# Patient Record
Sex: FEMALE | Race: WHITE | URBAN - METROPOLITAN AREA
[De-identification: names, ages, dates, MRNs, and addresses within clinical notes are randomized per-mention and may not be internally consistent; named-entity substitution may affect disease eponyms.]

---

## 2018-07-12 ENCOUNTER — OFFICE VISIT (OUTPATIENT)
Dept: FAMILY MEDICINE | Facility: OTHER | Age: 18
End: 2018-07-12
Payer: COMMERCIAL

## 2018-07-12 VITALS
WEIGHT: 170 LBS | BODY MASS INDEX: 28.32 KG/M2 | HEART RATE: 80 BPM | RESPIRATION RATE: 20 BRPM | HEIGHT: 65 IN | OXYGEN SATURATION: 98 % | DIASTOLIC BLOOD PRESSURE: 70 MMHG | SYSTOLIC BLOOD PRESSURE: 124 MMHG | TEMPERATURE: 98.6 F

## 2018-07-12 DIAGNOSIS — Z01.84 IMMUNITY STATUS TESTING: ICD-10-CM

## 2018-07-12 DIAGNOSIS — Z11.1 SCREENING EXAMINATION FOR PULMONARY TUBERCULOSIS: ICD-10-CM

## 2018-07-12 DIAGNOSIS — Z00.00 ENCOUNTER FOR ROUTINE ADULT HEALTH EXAMINATION WITHOUT ABNORMAL FINDINGS: Primary | ICD-10-CM

## 2018-07-12 PROCEDURE — 99395 PREV VISIT EST AGE 18-39: CPT | Performed by: PHYSICIAN ASSISTANT

## 2018-07-12 PROCEDURE — 86480 TB TEST CELL IMMUN MEASURE: CPT | Performed by: PHYSICIAN ASSISTANT

## 2018-07-12 PROCEDURE — 36415 COLL VENOUS BLD VENIPUNCTURE: CPT | Performed by: PHYSICIAN ASSISTANT

## 2018-07-12 PROCEDURE — 86706 HEP B SURFACE ANTIBODY: CPT | Performed by: PHYSICIAN ASSISTANT

## 2018-07-12 RX ORDER — FLUOXETINE 20 MG/5ML
SOLUTION ORAL DAILY
COMMUNITY

## 2018-07-12 ASSESSMENT — ENCOUNTER SYMPTOMS
SORE THROAT: 0
COUGH: 0
FEVER: 0
HEADACHES: 0
EYE PAIN: 0
CONSTIPATION: 0
HEMATURIA: 0
JOINT SWELLING: 0
DIZZINESS: 0
SHORTNESS OF BREATH: 0
MYALGIAS: 0
HEMATOCHEZIA: 0
BREAST MASS: 0
NERVOUS/ANXIOUS: 0
HEARTBURN: 0
CHILLS: 0
ARTHRALGIAS: 0
DIARRHEA: 0
WEAKNESS: 0
PARESTHESIAS: 0
ABDOMINAL PAIN: 0
FREQUENCY: 0
NAUSEA: 0
PALPITATIONS: 0
DYSURIA: 0

## 2018-07-12 ASSESSMENT — PAIN SCALES - GENERAL: PAINLEVEL: NO PAIN (0)

## 2018-07-12 NOTE — NURSING NOTE
"Chief Complaint   Patient presents with     Physical       Initial /70 (BP Location: Left arm, Patient Position: Chair, Cuff Size: Adult Regular)  Pulse 80  Temp 98.6  F (37  C) (Oral)  Resp 20  Ht 5' 4.72\" (1.644 m)  Wt 170 lb (77.1 kg)  SpO2 98%  BMI 28.53 kg/m2 Estimated body mass index is 28.53 kg/(m^2) as calculated from the following:    Height as of this encounter: 5' 4.72\" (1.644 m).    Weight as of this encounter: 170 lb (77.1 kg).  Medication Reconciliation: complete  "
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
## 2018-07-12 NOTE — MR AVS SNAPSHOT
"              After Visit Summary   7/12/2018    Dariana Olivas    MRN: [id number]           Patient Information     Date Of Birth          2000        Visit Information        Provider Department      7/12/2018 4:30 PM Anabel Shipman PA-C [facility]        Today's Diagnoses     Encounter for routine adult health examination without abnormal findings    -  1    Immunity status testing        Screening examination for pulmonary tuberculosis           Follow-ups after your visit        Follow-up notes from your care team     Return in about 1 year (around 7/12/2019).      Who to contact     If you have questions or need follow up information about today's clinic visit or your schedule please contact [facility] directly at [phone].  Normal or non-critical lab and imaging results will be communicated to you by MyChart, letter or phone within 4 business days after the clinic has received the results. If you do not hear from us within 7 days, please contact the clinic through MyChart or phone. If you have a critical or abnormal lab result, we will notify you by phone as soon as possible.  Submit refill requests through [employer] or call your pharmacy and they will forward the refill request to us. Please allow 3 business days for your refill to be completed.          Additional Information About Your Visit        Care EveryWhere ID     This is your Care EveryWhere ID. This could be used by other organizations to access your [facility] medical records  [id number]        Your Vitals Were     Pulse Temperature Respirations Height Pulse Oximetry BMI (Body Mass Index)    80 98.6  F (37  C) (Oral) 20 5' 4.72\" (1.644 m) 98% 28.53 kg/m2       Blood Pressure from Last 3 Encounters:   07/12/18 124/70   08/02/16 112/64   07/11/16 98/60    Weight from Last 3 Encounters:   07/12/18 170 lb (77.1 kg)   08/02/16 149 lb 9.6 oz (67.9 kg) (83 %)*   07/11/16 151 lb (68.5 kg) (84 %)* "     * Growth percentiles are based on [facility] 2-20 Years data.              We Performed the Following     HEP B SURFACE ANTIBODY     M Tuberculosis by [facility]iferon        Primary Care Provider Fax #    Physician No Ref-Primary [phone]       No address on file        Equal Access to Services     BRYNNNIRMAL BINDU : Scooby gisela garg kaylin Tan, waaxda luqadaha, qaybta kaalmada adefreddy, alexander bojorquez laTrinityjairo cummings. So [facility] [phone].    ATENCIÓN: Si habla español, tiene a dye disposición servicios gratuitos de asistencia lingüística. Llame al [phone].    We comply with applicable federal civil rights laws and [place] laws. We do not discriminate on the basis of race, color, national origin, age, disability, sex, sexual orientation, or gender identity.            Thank you!     Thank you for choosing [facility]  for your care. Our goal is always to provide you with excellent care. Hearing back from our patients is one way we can continue to improve our services. Please take a few minutes to complete the written survey that you may receive in the mail after your visit with us. Thank you!             Your Updated Medication List - Protect others around you: Learn how to safely use, store and throw away your medicines at www.disposemymeds.org.          This list is accurate as of 7/12/18  5:21 PM.  Always use your most recent med list.                   Brand Name Dispense Instructions for use Diagnosis    ACZONE 5 % Gel   Generic drug:  Dapsone           adapalene 0.1 % gel    DIFFERIN          FLUoxetine 20 MG/5ML solution    PROzac     Take by mouth daily        LAYNE 3-0.02 MG per tablet   Generic drug:  drospirenone-ethinyl estradiol           sulfacetamide sodium-sulfur 10-5 % Emul

## 2018-07-13 LAB — HBV SURFACE AB SERPL IA-ACNC: 74.95 M[IU]/ML

## 2018-07-16 ENCOUNTER — TELEPHONE (OUTPATIENT)
Dept: FAMILY MEDICINE | Facility: OTHER | Age: 18
End: 2018-07-16

## 2018-07-16 LAB
M TB TUBERC IFN-G BLD QL: NEGATIVE
M TB TUBERC IFN-G/MITOGEN IGNF BLD: 0.01 IU/ML

## 2018-07-16 NOTE — PROGRESS NOTES
Please call patient with the following message    Hep B shows immunity and TB gold was negative. I have completed her form. Original for patient to  and please make copy for scanning.    Geraldo Shipman PA-C

## 2018-07-16 NOTE — LETTER
[address]  [facility] [address]  [phone]          July 18, 2018    Dariana Olivas                                                                                                                     [id number] [facility] [address]            Dear Mahesh [facility] we were unable to reach you by phone. We were trying to reach you to let you know your form has been completed and you can pick it up at the [facility] . Lab shows Hep B shows immunity and TB gold was negative. Please call [phone] with any questions or to make any follow up appointments.        Sincerely,         Anabel Shipman PA-C

## 2018-07-16 NOTE — TELEPHONE ENCOUNTER
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   Left message for patient to call back. Please let her know form is completed and was placed at the  for her to . Copy made for our records and placed in scanning.  Mirna Zhang CMA

## 2018-07-16 NOTE — TELEPHONE ENCOUNTER
----- Message from Anabel Shipman PA-C sent at 7/16/2018  3:35 PM CDT -----  Please call patient with the following message    Hep B shows immunity and TB gold was negative. I have completed her form. Original for patient to  and please make copy for scanning.    Geraldo Shipman PA-C

## 2018-07-18 NOTE — TELEPHONE ENCOUNTER
LMTC see note below when call is returned. 3rd attempt to reach patient. Sending letter and closing encounter.    Kathleen Huerta MA

## 2018-08-17 DIAGNOSIS — L70.0 ACNE VULGARIS: Primary | ICD-10-CM

## 2018-08-17 DIAGNOSIS — L70.0 ACNE VULGARIS: ICD-10-CM

## 2018-08-17 LAB
B-HCG SERPL-ACNC: <1 IU/L (ref 0–5)
BUN SERPL-MCNC: 9 MG/DL (ref 7–30)
CREAT SERPL-MCNC: 0.73 MG/DL (ref 0.52–1.04)
GFR SERPL CREATININE-BSD FRML MDRD: >90 ML/MIN/1.7M2
POTASSIUM SERPL-SCNC: 3.4 MMOL/L (ref 3.4–5.3)

## 2018-08-17 PROCEDURE — 82565 ASSAY OF CREATININE: CPT | Performed by: DERMATOLOGY

## 2018-08-17 PROCEDURE — 36415 COLL VENOUS BLD VENIPUNCTURE: CPT | Performed by: DERMATOLOGY

## 2018-08-17 PROCEDURE — 84132 ASSAY OF SERUM POTASSIUM: CPT | Performed by: DERMATOLOGY

## 2018-08-17 PROCEDURE — 84702 CHORIONIC GONADOTROPIN TEST: CPT | Performed by: DERMATOLOGY

## 2018-08-17 PROCEDURE — 84520 ASSAY OF UREA NITROGEN: CPT | Performed by: DERMATOLOGY
